# Patient Record
Sex: FEMALE | Race: WHITE | Employment: OTHER | URBAN - NONMETROPOLITAN AREA
[De-identification: names, ages, dates, MRNs, and addresses within clinical notes are randomized per-mention and may not be internally consistent; named-entity substitution may affect disease eponyms.]

---

## 2020-06-03 LAB
ALBUMIN: 3.7 GM/DL (ref 3.1–4.5)
ALP BLD-CCNC: 88 U/L (ref 45–117)
ALT SERPL-CCNC: 24 U/L (ref 12–78)
AST SERPL-CCNC: 22 IU/L (ref 3–35)
BILIRUB SERPL-MCNC: 0.3 MG/DL (ref 0.2–1)
BUN BLDV-MCNC: 25 MG/DL (ref 7–24)
CALCIUM SERPL-MCNC: 9 MG/DL (ref 8.5–10.5)
CHLORIDE BLD-SCNC: 110 MMOL/L (ref 98–107)
CHOLESTEROL: 99 MG/DL
CO2: 22 MMOL/L (ref 21–32)
CREAT SERPL-MCNC: 1.2 MG/DL (ref 0.55–1.02)
GFR AFRICAN AMERICAN: 51 ML/MIN
GFR SERPL CREATININE-BSD FRML MDRD: 42 ML/MIN/
GLUCOSE: 94 MG/DL (ref 65–99)
HCT VFR BLD CALC: 36 % (ref 37–47)
HDLC SERPL-MCNC: 38 MG/DL (ref 40–60)
HEMOGLOBIN: 11.8 G/DL (ref 12–16)
LDL CHOLESTEROL: 31 MG/DL (ref 9–159)
MCH RBC QN AUTO: 30.1 PG (ref 27–31)
MCHC RBC AUTO-ENTMCNC: 32.8 G/DL (ref 33–37)
MCV RBC AUTO: 91.8 FL (ref 81–99)
NUCLEATED RED BLOOD CELLS: 0 % (ref 0–0)
PDW BLD-RTO: 14.1 % (ref 0–14.5)
PLATELET # BLD: 239 10*3/UL (ref 130–400)
PMV BLD AUTO: 9.4 FL (ref 9.6–12.3)
POTASSIUM SERPL-SCNC: 4.3 MMOL/L (ref 3.5–5.1)
RBC # BLD: 3.92 10*6/UL (ref 4.1–5.1)
SODIUM BLD-SCNC: 139 MMOL/L (ref 136–145)
TOTAL PROTEIN: 8.8 GM/DL (ref 6.4–8.2)
TRIGL SERPL-MCNC: 149 MG/DL
TSH SERPL DL<=0.05 MIU/L-ACNC: 4.87 UIU/ML (ref 0.36–4.75)
VITAMIN D 25-HYDROXY: 54.3 NG/ML (ref 30–100)
VLDLC SERPL CALC-MCNC: 30 MG/DL (ref 6–40)
WBC # BLD: 5.7 10*3/UL (ref 4.8–10.8)

## 2020-07-15 LAB
ALBUMIN: 3.7 GM/DL (ref 3.1–4.5)
ALP BLD-CCNC: 86 U/L (ref 45–117)
ALT SERPL-CCNC: 18 U/L (ref 12–78)
AST SERPL-CCNC: 20 IU/L (ref 3–35)
BILIRUB SERPL-MCNC: 0.3 MG/DL (ref 0.2–1)
BUN BLDV-MCNC: 19 MG/DL (ref 7–24)
CALCIUM SERPL-MCNC: 9.3 MG/DL (ref 8.5–10.5)
CHLORIDE BLD-SCNC: 108 MMOL/L (ref 98–107)
CHOLESTEROL: 114 MG/DL
CO2: 24 MMOL/L (ref 21–32)
CREAT SERPL-MCNC: 1.04 MG/DL (ref 0.55–1.02)
GFR AFRICAN AMERICAN: > 60 ML/MIN
GFR SERPL CREATININE-BSD FRML MDRD: 50 ML/MIN/
GLUCOSE: 94 MG/DL (ref 65–99)
HCT VFR BLD CALC: 37.5 % (ref 37–47)
HDLC SERPL-MCNC: 36 MG/DL (ref 40–60)
HEMOGLOBIN: 11.9 G/DL (ref 12–16)
LDL CHOLESTEROL: 30 MG/DL (ref 9–159)
MCH RBC QN AUTO: 29.2 PG (ref 27–31)
MCHC RBC AUTO-ENTMCNC: 31.7 G/DL (ref 33–37)
MCV RBC AUTO: 91.9 FL (ref 81–99)
NUCLEATED RED BLOOD CELLS: 0 % (ref 0–0)
PDW BLD-RTO: 14.3 % (ref 0–14.5)
PLATELET # BLD: 264 10*3/UL (ref 130–400)
PMV BLD AUTO: 9.4 FL (ref 9.6–12.3)
POTASSIUM SERPL-SCNC: 3.9 MMOL/L (ref 3.5–5.1)
RBC # BLD: 4.08 10*6/UL (ref 4.1–5.1)
SODIUM BLD-SCNC: 140 MMOL/L (ref 136–145)
T4 FREE: 1.03 NG/DL (ref 0.76–1.46)
TOTAL PROTEIN: 8.6 GM/DL (ref 6.4–8.2)
TRIGL SERPL-MCNC: 242 MG/DL
TSH SERPL DL<=0.05 MIU/L-ACNC: 4.23 UIU/ML (ref 0.36–4.75)
VITAMIN D 25-HYDROXY: 49.3 NG/ML (ref 30–100)
VLDLC SERPL CALC-MCNC: 48 MG/DL (ref 6–40)
WBC # BLD: 6 10*3/UL (ref 4.8–10.8)

## 2020-08-05 ENCOUNTER — OUTSIDE SERVICES (OUTPATIENT)
Dept: FAMILY MEDICINE CLINIC | Age: 85
End: 2020-08-05
Payer: MEDICARE

## 2020-08-05 VITALS
WEIGHT: 114 LBS | TEMPERATURE: 96.8 F | DIASTOLIC BLOOD PRESSURE: 70 MMHG | HEART RATE: 74 BPM | RESPIRATION RATE: 16 BRPM | SYSTOLIC BLOOD PRESSURE: 122 MMHG

## 2020-08-05 PROBLEM — F03.90 DEMENTIA WITHOUT BEHAVIORAL DISTURBANCE (HCC): Status: ACTIVE | Noted: 2020-08-05

## 2020-08-05 PROBLEM — H40.9 GLAUCOMA: Status: ACTIVE | Noted: 2020-08-05

## 2020-08-05 PROBLEM — H35.30 MACULAR DEGENERATION: Status: ACTIVE | Noted: 2020-08-05

## 2020-08-05 PROBLEM — R27.0 ATAXIA: Status: ACTIVE | Noted: 2020-08-05

## 2020-08-05 PROBLEM — L20.9 ATOPIC DERMATITIS: Status: ACTIVE | Noted: 2020-08-05

## 2020-08-05 PROBLEM — L30.9 ECZEMA OF BOTH UPPER EXTREMITIES: Status: ACTIVE | Noted: 2020-08-05

## 2020-08-05 ASSESSMENT — ENCOUNTER SYMPTOMS
RESPIRATORY NEGATIVE: 1
ALLERGIC/IMMUNOLOGIC NEGATIVE: 1
GASTROINTESTINAL NEGATIVE: 1

## 2020-08-06 NOTE — PROGRESS NOTES
Subjective:      Patient ID: Iran Kanner is a 80 y.o. female. Patient seen today as an acute basis per request of nursing staff. Nursing staff stated that patient has been complaining of intense itch to the upper extremities. Patient has been scratching the upper extremities and both arms have scabbed areas. Patient has a red raised rash to the upper extremities and skin is very dry,      Review of Systems   Constitutional: Negative. HENT: Negative. Eyes: Positive for visual disturbance. Respiratory: Negative. Cardiovascular: Negative. Gastrointestinal: Negative. Endocrine: Negative. Genitourinary: Negative. Musculoskeletal: Negative. Skin: Positive for rash. Red raised rash to bilateral upper extremities. Dry skin to upper extremities. Allergic/Immunologic: Negative. Neurological: Negative. Hematological: Negative. Psychiatric/Behavioral: Positive for confusion. Objective:   Physical Exam  Vitals signs and nursing note reviewed. Constitutional:       General: She is not in acute distress. Appearance: Normal appearance. She is normal weight. She is not ill-appearing, toxic-appearing or diaphoretic. HENT:      Head: Normocephalic. Right Ear: Tympanic membrane, ear canal and external ear normal. There is no impacted cerumen. Left Ear: Tympanic membrane, ear canal and external ear normal. There is no impacted cerumen. Nose: Nose normal. No congestion or rhinorrhea. Mouth/Throat:      Mouth: Mucous membranes are moist.      Pharynx: Oropharynx is clear. No oropharyngeal exudate or posterior oropharyngeal erythema. Eyes:      General: No scleral icterus. Right eye: No discharge. Left eye: No discharge. Extraocular Movements: Extraocular movements intact. Conjunctiva/sclera: Conjunctivae normal.      Pupils: Pupils are equal, round, and reactive to light.    Neck:      Musculoskeletal: Normal range of motion and neck supple. No neck rigidity or muscular tenderness. Vascular: No carotid bruit. Cardiovascular:      Rate and Rhythm: Normal rate and regular rhythm. Pulses: Normal pulses. Heart sounds: Normal heart sounds. No murmur. No friction rub. No gallop. Pulmonary:      Effort: Pulmonary effort is normal. No respiratory distress. Breath sounds: Normal breath sounds. No stridor. No wheezing, rhonchi or rales. Chest:      Chest wall: No tenderness. Abdominal:      General: Abdomen is flat. Bowel sounds are normal. There is no distension. Palpations: Abdomen is soft. There is no mass. Tenderness: There is no abdominal tenderness. There is no right CVA tenderness, left CVA tenderness, guarding or rebound. Hernia: No hernia is present. Musculoskeletal: Normal range of motion. General: No swelling, tenderness, deformity or signs of injury. Right lower leg: No edema. Left lower leg: No edema. Lymphadenopathy:      Cervical: No cervical adenopathy. Skin:     General: Skin is warm and dry. Capillary Refill: Capillary refill takes less than 2 seconds. Coloration: Skin is not jaundiced or pale. Findings: Rash present. No bruising or lesion. Comments: Red raised rash to upper extremities. Skin is dry with small scabbed abrasions to upper extremities. Neurological:      General: No focal deficit present. Mental Status: She is alert. She is disoriented. Cranial Nerves: No cranial nerve deficit. Sensory: No sensory deficit. Motor: No weakness. Coordination: Coordination normal.      Gait: Gait normal.      Deep Tendon Reflexes: Reflexes normal.   Psychiatric:         Mood and Affect: Mood normal.         Behavior: Behavior normal.     /70   Pulse 74   Temp 96.8 °F (36 °C)   Resp 16   Wt 114 lb (51.7 kg)       Assessment:       Diagnosis Orders   1. Eczema of both upper extremities     2.  Atopic dermatitis, unspecified type     3. Dementia without behavioral disturbance, unspecified dementia type ([address]     4. Ataxia     5. Glaucoma, unspecified glaucoma type, unspecified laterality     6. Macular degeneration, unspecified laterality, unspecified type             Plan:    Reviewed medication and plan of care. Continue medication and plan of care. Begin Kenalog cream 0.5 % applied topically to upper extremities twice a day for 14 days, then reevaluate need. Noted that patient was treated with kenalog cream recently and skin did seem to be improved afterwards. Encourage kip bower. Claritin 10 mg PO at HS for itch for 10 days. Follow up in 2 weeks. Staff to notify Dr Juliann Mendes or NP of any changes or concerns.         Ronal Yanes, APRN - CNP

## 2020-08-20 RX ORDER — VIT C/E/CUPERIC/ZINC/LUTEIN 226-90-0.8
1 CAPSULE ORAL EVERY 12 HOURS
COMMUNITY
End: 2020-08-20 | Stop reason: SDUPTHER

## 2020-08-20 RX ORDER — VIT C/E/CUPERIC/ZINC/LUTEIN 226-90-0.8
1 CAPSULE ORAL EVERY 12 HOURS
Qty: 62 CAPSULE | Refills: 5 | Status: SHIPPED
Start: 2020-08-20 | End: 2021-02-24 | Stop reason: SDUPTHER

## 2020-09-02 LAB — TSH SERPL DL<=0.05 MIU/L-ACNC: 2.64 UIU/ML (ref 0.36–4.75)

## 2020-09-08 RX ORDER — TIMOLOL MALEATE 5 MG/ML
1 SOLUTION/ DROPS OPHTHALMIC DAILY
COMMUNITY
Start: 2020-08-20 | End: 2020-09-08 | Stop reason: SDUPTHER

## 2020-09-09 RX ORDER — TIMOLOL MALEATE 5 MG/ML
1 SOLUTION/ DROPS OPHTHALMIC DAILY
Qty: 5 BOTTLE | Refills: 0 | Status: SHIPPED
Start: 2020-09-09 | End: 2020-10-05 | Stop reason: SDUPTHER

## 2020-10-05 RX ORDER — TIMOLOL MALEATE 5 MG/ML
1 SOLUTION/ DROPS OPHTHALMIC DAILY
Qty: 5 ML | Refills: 5 | Status: SHIPPED
Start: 2020-10-05 | End: 2021-06-09 | Stop reason: SDUPTHER

## 2020-12-03 ENCOUNTER — OUTSIDE SERVICES (OUTPATIENT)
Dept: FAMILY MEDICINE CLINIC | Age: 85
End: 2020-12-03
Payer: MEDICARE

## 2020-12-03 VITALS
DIASTOLIC BLOOD PRESSURE: 69 MMHG | WEIGHT: 114 LBS | RESPIRATION RATE: 20 BRPM | TEMPERATURE: 98.1 F | SYSTOLIC BLOOD PRESSURE: 127 MMHG | HEART RATE: 73 BPM

## 2020-12-03 PROBLEM — R26.81 UNSTEADY GAIT: Status: ACTIVE | Noted: 2020-12-03

## 2020-12-03 PROBLEM — R29.898 WEAKNESS OF BOTH LOWER EXTREMITIES: Status: ACTIVE | Noted: 2020-12-03

## 2020-12-03 ASSESSMENT — ENCOUNTER SYMPTOMS
RESPIRATORY NEGATIVE: 1
ALLERGIC/IMMUNOLOGIC NEGATIVE: 1
GASTROINTESTINAL NEGATIVE: 1
EYES NEGATIVE: 1

## 2020-12-03 NOTE — PROGRESS NOTES
Subjective:      Patient ID: Vern Mendez is a 80 y.o. female. Patient seen today per request of nursing staff for a chief complaint of gait dysfunction and weakness. Staffing are asking for a recommendation of physical therapy at this time for strengthening. Patient complains of dry skin and itching especially to the upper extremities. Review of Systems   Constitutional: Negative. HENT: Negative. Eyes: Negative. Respiratory: Negative. Cardiovascular: Negative. Gastrointestinal: Negative. Endocrine: Negative. Genitourinary: Negative. Musculoskeletal: Positive for gait problem. Skin: Negative. Dry skin to upper extremities and itching   Allergic/Immunologic: Negative. Neurological: Positive for weakness. Hematological: Negative. Psychiatric/Behavioral: Positive for confusion. Objective:   Physical Exam  Vitals signs and nursing note reviewed. Constitutional:       General: She is not in acute distress. Appearance: Normal appearance. She is normal weight. She is not ill-appearing, toxic-appearing or diaphoretic. HENT:      Head: Normocephalic. Right Ear: Tympanic membrane, ear canal and external ear normal. There is no impacted cerumen. Left Ear: Tympanic membrane, ear canal and external ear normal. There is no impacted cerumen. Nose: Nose normal. No congestion or rhinorrhea. Mouth/Throat:      Mouth: Mucous membranes are moist.      Pharynx: Oropharynx is clear. No oropharyngeal exudate or posterior oropharyngeal erythema. Eyes:      General: No scleral icterus. Right eye: No discharge. Left eye: No discharge. Extraocular Movements: Extraocular movements intact. Conjunctiva/sclera: Conjunctivae normal.      Pupils: Pupils are equal, round, and reactive to light. Neck:      Musculoskeletal: Normal range of motion and neck supple. No neck rigidity or muscular tenderness. Vascular: No carotid bruit. Cardiovascular:      Rate and Rhythm: Normal rate and regular rhythm. Pulses: Normal pulses. Heart sounds: Normal heart sounds. No murmur. No friction rub. No gallop. Pulmonary:      Effort: Pulmonary effort is normal. No respiratory distress. Breath sounds: Normal breath sounds. No stridor. No wheezing, rhonchi or rales. Chest:      Chest wall: No tenderness. Abdominal:      General: Abdomen is flat. Bowel sounds are normal. There is no distension. Palpations: Abdomen is soft. There is no mass. Tenderness: There is no abdominal tenderness. There is no right CVA tenderness, left CVA tenderness, guarding or rebound. Hernia: No hernia is present. Musculoskeletal: Normal range of motion. General: No swelling, tenderness, deformity or signs of injury. Right lower leg: No edema. Left lower leg: No edema. Lymphadenopathy:      Cervical: No cervical adenopathy. Skin:     General: Skin is warm and dry. Capillary Refill: Capillary refill takes less than 2 seconds. Coloration: Skin is not jaundiced or pale. Findings: No bruising, erythema, lesion or rash. Comments: Extremely dry scaly skin to upper extremities. Neurological:      Mental Status: She is alert. She is disoriented. Cranial Nerves: No cranial nerve deficit. Sensory: No sensory deficit. Motor: Weakness present. Coordination: Coordination abnormal.      Gait: Gait abnormal.      Deep Tendon Reflexes: Reflexes normal.   Psychiatric:         Mood and Affect: Mood normal.         Behavior: Behavior normal.     /69   Pulse 73   Temp 98.1 °F (36.7 °C)   Resp 20   Wt 114 lb (51.7 kg)       Assessment:       Diagnosis Orders   1. Macular degeneration of both eyes, unspecified type     2. Unsteady gait     3. Weakness of both lower extremities     4.  Dementia without behavioral disturbance, unspecified dementia type (HCC)     5. Eczema of both upper extremities 6. Ataxia             Plan:      Reviewed medication and plan of care. Continue medications and plan of care. Staff to encourage use of house lotion to the upper extremities for dry skin/itch. Kelin Brim for PT evaluation and treatment for gait dysfunction and weakness. Follow up in one month and PRN. Staff to notify Dr or NP with any issues or concerns.         Ish Higuera, JOJO - CNP

## 2020-12-23 RX ORDER — OMEPRAZOLE 20 MG/1
20 CAPSULE, DELAYED RELEASE ORAL DAILY
Qty: 31 CAPSULE | Refills: 5 | Status: SHIPPED
Start: 2020-12-23 | End: 2021-06-24 | Stop reason: SDUPTHER

## 2020-12-23 RX ORDER — SENNOSIDES 8.6 MG
650 CAPSULE ORAL 2 TIMES DAILY
Qty: 62 TABLET | Refills: 5 | Status: SHIPPED | OUTPATIENT
Start: 2020-12-23 | End: 2021-12-27 | Stop reason: SDUPTHER

## 2021-01-13 LAB
ALBUMIN: 3.7 GM/DL (ref 3.1–4.5)
ALP BLD-CCNC: 83 U/L (ref 45–117)
ALT SERPL-CCNC: 22 U/L (ref 12–78)
AST SERPL-CCNC: 21 IU/L (ref 3–35)
BILIRUB SERPL-MCNC: 0.4 MG/DL (ref 0.2–1)
BUN BLDV-MCNC: 23 MG/DL (ref 7–24)
CALCIUM SERPL-MCNC: 9.2 MG/DL (ref 8.5–10.5)
CHLORIDE BLD-SCNC: 109 MMOL/L (ref 98–107)
CHOLESTEROL: 110 MG/DL
CO2: 19 MMOL/L (ref 21–32)
CREAT SERPL-MCNC: 1.15 MG/DL (ref 0.55–1.02)
GFR AFRICAN AMERICAN: 53 ML/MIN
GFR SERPL CREATININE-BSD FRML MDRD: 44 ML/MIN/
GLUCOSE: 112 MG/DL (ref 65–99)
HCT VFR BLD CALC: 38.6 % (ref 37–47)
HDLC SERPL-MCNC: 42 MG/DL (ref 40–60)
HEMOGLOBIN: 12.4 G/DL (ref 12–16)
LDL CHOLESTEROL: 34 MG/DL (ref 9–159)
MCH RBC QN AUTO: 29.2 PG (ref 27–31)
MCHC RBC AUTO-ENTMCNC: 32.1 G/DL (ref 33–37)
MCV RBC AUTO: 90.8 FL (ref 81–99)
NUCLEATED RED BLOOD CELLS: 0 % (ref 0–0)
PDW BLD-RTO: 14.2 % (ref 0–14.5)
PLATELET # BLD: 236 10*3/UL (ref 130–400)
PMV BLD AUTO: 9.4 FL (ref 9.6–12.3)
POTASSIUM SERPL-SCNC: 4.2 MMOL/L (ref 3.5–5.1)
RBC # BLD: 4.25 10*6/UL (ref 4.1–5.1)
SODIUM BLD-SCNC: 138 MMOL/L (ref 136–145)
T4 FREE: 1.04 NG/DL (ref 0.76–1.46)
TOTAL PROTEIN: 8.4 GM/DL (ref 6.4–8.2)
TRIGL SERPL-MCNC: 171 MG/DL
TSH SERPL DL<=0.05 MIU/L-ACNC: 3.34 UIU/ML (ref 0.36–4.75)
VITAMIN D 25-HYDROXY: 50.3 NG/ML (ref 30–100)
VLDLC SERPL CALC-MCNC: 34 MG/DL (ref 6–40)
WBC # BLD: 6 10*3/UL (ref 4.8–10.8)

## 2021-01-21 RX ORDER — ERGOCALCIFEROL 1.25 MG/1
50000 CAPSULE ORAL
Qty: 6 CAPSULE | Refills: 0 | Status: SHIPPED | OUTPATIENT
Start: 2021-01-21 | End: 2021-07-21 | Stop reason: SDUPTHER

## 2021-02-24 RX ORDER — VIT C/E/CUPERIC/ZINC/LUTEIN 226-90-0.8
1 CAPSULE ORAL EVERY 12 HOURS
Qty: 62 CAPSULE | Refills: 5 | Status: SHIPPED
Start: 2021-02-24 | End: 2021-08-18 | Stop reason: SDUPTHER

## 2021-03-03 ENCOUNTER — OUTSIDE SERVICES (OUTPATIENT)
Dept: FAMILY MEDICINE CLINIC | Age: 86
End: 2021-03-03
Payer: MEDICARE

## 2021-03-03 DIAGNOSIS — Z78.9 DECREASED INDEPENDENCE WITH ACTIVITIES OF DAILY LIVING: Primary | ICD-10-CM

## 2021-03-03 DIAGNOSIS — R63.0 DECREASED APPETITE: ICD-10-CM

## 2021-03-03 DIAGNOSIS — E55.9 VITAMIN D DEFICIENCY: ICD-10-CM

## 2021-03-03 DIAGNOSIS — F03.90 DEMENTIA WITHOUT BEHAVIORAL DISTURBANCE, UNSPECIFIED DEMENTIA TYPE: ICD-10-CM

## 2021-03-03 DIAGNOSIS — K21.9 GASTROESOPHAGEAL REFLUX DISEASE WITHOUT ESOPHAGITIS: ICD-10-CM

## 2021-03-03 NOTE — PROGRESS NOTES
3/3/2021    Jose Chandra  1/23/1930    This resident is being seen today for a follow-up evaluation visit. She is a resident who has long-term medical conditions including dementia, macular degeneration, cellulitis, glaucoma, ataxia, arthritis, vitamin D deficiency, osteoarthritis, arthroplasty, insomnia, confusion, lumbar spinal problems with lumbosacral strain, with surgical history of right total hip. She is a 80 y.o. female resident who is being seen today for a follow-up evaluation visit. There was specific paperwork that needed to be signed with respect to insurance with evaluation of resident needed. This is a resident who resides on our memory care unit/[facility]. She is ambulatory with the use of a walker and staff indicates that she has had no changes in behavior in terms of aggressive, combative, or disruptive behaviors. Staff does indicate that she does need some degree of assistance with respect to her activities of daily living. At this time she offers no acute complaints and denies any current pain, headaches or dizziness, sore throat, coughing or shortness of breath, nausea or vomiting, constipation or diarrhea, fever or chills, recent falls or syncopal events. Medications:  Boost energy drink at lunchtime  Acetaminophen 650 mg twice daily  Memantine HCL 5 mg twice daily  Mirtazapine 15 mg at bedtime  Omeprazole 20 mg daily  PreserVision lutein softgel every 12 hours  Rivastigmine 6 mg twice a day  Timolol 0.5% eyedrops 1 drop each eye daily  Vitamin D2 50,000 units monthly  Acetaminophen 650 mg daily as needed Izzy-Lanta 30 cc every 2-4 hours as needed  Loperamide 2 mg every 4 hours as needed  Zofran 4 mg every 6 hours as needed        Objective     Vital Signs: P1 28/64 pulse 84 temperature 96.1 weight 116 pounds    Physical examination:Skin is essentially warm and dry. HEENT unremarkable. Neck is supple. Heart regular rate and rhythm. No rubs, gallops or murmurs noted.   Lungs are clear to auscultation. No evidence of rhonchi, rales, or wheezing. Abdomen is soft, supple and non-tender. Bowel sounds are noted x4 quadrants. No rigidity, guarding or rebound tenderness. Negative Mo's, negative McBurney's, negative Abdulaziz's. Extremities; no true pitting edema. Pulses are adequate. No clubbing  or no cyanosis noted. Neurologically she  is alert and oriented x3. No evidence of paralysis or paresthesias noted. Diagnoses and all orders for this visit:    Decreased independence with activities of daily living  Comments: To maintain assistance. Dementia without behavioral disturbance, unspecified dementia type ([facility] [address]  Comments:  Stable with memantine and rivastigmine. Vitamin D deficiency  Comments:  Controlled with vitamin D supplementation. Monitor vitamin D level accordingly. Gastroesophageal reflux disease without esophagitis  Comments:  Stable with omeprazole. Decreased appetite  Comments:  Maintain boost energy drink daily. Monitor weights accordingly. Plan: Plan of care was discussed with the healthcare team with meds and labs reviewed. Resident did have recent labs 1/13/2021 with a BUN/creatinine 23/1.15 GFR 53 lipid panel within normal limits H/H 12.4/38.6 vitamin D 50.3 TSH 3.340. This resident did appear to be doing relatively well without any major cause for concern so we will just reevaluate her labs in the course the next 1 to 2 months. I did furthermore sign off on her paperwork regarding insurance coverage. I am to go ahead and continue forth with her current medical regimen as already set forth at this time and maintain close observation with respect to her dementia. I will furthermore track her intakes, monitor her weights and behaviors, and see her routinely and as needed with further orders forthcoming. JOSE MARIA DEUTSCH, APRN - CNP      *Note was creating using voice recognition software.   The document was reviewed however grammatical errors may exist.

## 2021-05-05 ENCOUNTER — OUTSIDE SERVICES (OUTPATIENT)
Dept: FAMILY MEDICINE CLINIC | Age: 86
End: 2021-05-05
Payer: MEDICARE

## 2021-05-05 DIAGNOSIS — H40.9 GLAUCOMA, UNSPECIFIED GLAUCOMA TYPE, UNSPECIFIED LATERALITY: ICD-10-CM

## 2021-05-05 DIAGNOSIS — F03.90 DEMENTIA WITHOUT BEHAVIORAL DISTURBANCE, UNSPECIFIED DEMENTIA TYPE: Primary | ICD-10-CM

## 2021-05-05 DIAGNOSIS — E55.9 VITAMIN D DEFICIENCY: ICD-10-CM

## 2021-05-05 DIAGNOSIS — G47.00 INSOMNIA, UNSPECIFIED TYPE: ICD-10-CM

## 2021-05-05 DIAGNOSIS — M19.90 OSTEOARTHRITIS, UNSPECIFIED OSTEOARTHRITIS TYPE, UNSPECIFIED SITE: ICD-10-CM

## 2021-05-05 NOTE — PROGRESS NOTES
2021    Yen Gibbons  1930    CC: This resident is being seen today for a follow-up evaluation visit with respect to a history and physical.    CODE STATUS: DNR CC    Allergies: No known drug allergies    Social history:  after being  for 36 years and does have 1 son and 1 daughter. Resident states she worked at an [employer] office as well as the  of her [employer]ism. She denies any smoking, alcohol use or illicit drug use. Family history: Resident states that she had 12 siblings and that her mother  at the age of 46 due to diabetic complications in her father at the age of 54 due to underlying lung cancer and heart complications. PMH:  She is a resident who has long-term medical conditions including dementia, macular degeneration, cellulitis, glaucoma, ataxia, arthritis, vitamin D deficiency, osteoarthritis, arthroplasty, insomnia, confusion, lumbar spinal problems with lumbosacral strain, with surgical history of right total hip. ROS:  She is a 80 y.o. female resident who is being seen today for a a history and physical.  There was specific paperwork that needed to be signed with respect to insurance with evaluation of resident needed. This is a resident who is responsive to verbal and tactile stimuli and fairly alert and oriented, who does reside here on the memory care unit/[facility]. Staffing indicates that she has had no changes in behavior in terms of aggressive, combative, or disruptive behaviors. This is a resident who utilizes a walker for assistance and does require some help with respect to activities of daily living. She is currently in no acute distress and offers no complaints of pain,  headaches or dizziness, sore throat, coughing or shortness of breath, nausea or vomiting, constipation or diarrhea, fever or chills, recent falls or syncopal events.       Medications:  Boost energy drink at lunchtime  Acetaminophen 650 mg twice daily  Memantine HCL 5 mg twice daily  Mirtazapine 15 mg at bedtime  Omeprazole 20 mg daily  PreserVision lutein softgel every 12 hours  Rivastigmine 6 mg twice a day  Timolol 0.5% eyedrops 1 drop each eye daily  Vitamin D2 50,000 units monthly  Acetaminophen 650 mg daily as needed Izzy-Lanta 30 cc every 2-4 hours as needed  Loperamide 2 mg every 4 hours as needed  Zofran 4 mg every 6 hours as needed        Objective     Vital Signs: /79 pulse 81 Resp 18 temperature 97.8 weight 117 pounds    Physical examination:Skin is essentially warm and dry. HEENT unremarkable. Neck is supple. Heart regular rate and rhythm. No rubs, gallops or murmurs noted. Lungs are clear to auscultation. No evidence of rhonchi, rales, or wheezing. Abdomen is soft, supple and non-tender. Bowel sounds are noted x4 quadrants. No rigidity, guarding or rebound tenderness. Negative Mo's, negative McBurney's, negative Abdulaziz's. Extremities; no true pitting edema. Pulses are adequate. No clubbing  or no cyanosis noted. Neurologically she  is alert and oriented x3. No evidence of paralysis or paresthesias noted. Diagnoses and all orders for this visit:    Dementia without behavioral disturbance, unspecified dementia type ([facility] [address]  Comments:  Resident resides on memory care and maintains memantine with rivastigmine    Vitamin D deficiency  Comments:  Controlled with vitamin D supplementation and close observation of vitamin D level    Osteoarthritis, unspecified osteoarthritis type, unspecified site  Comments:  Stable with vitamin supplementation    Insomnia, unspecified type  Comments:  Controlled and maintains mirtazapine at bedtime    Glaucoma, unspecified glaucoma type, unspecified laterality  Comments:  Stable with timolol drops    Plan: Plan of care was discussed with the healthcare team with meds and labs reviewed. This resident did have recent labs completed in January which were furthermore noted and reviewed.  Resident has been stable and without concerns, so we will therefore maintain her current medical regimen as directed with the benefit of this resident residing here in memory care. I will continue to track her intakes, monitor her weights and behaviors, and see her routinely and as needed with further orders forthcoming. JOSE MARIA DEUTSCH, APRN - CNP      *Note was creating using voice recognition software.   The document was reviewed however grammatical errors may exist.

## 2021-06-09 RX ORDER — TIMOLOL MALEATE 5 MG/ML
1 SOLUTION/ DROPS OPHTHALMIC DAILY
Qty: 5 ML | Refills: 5 | Status: SHIPPED | OUTPATIENT
Start: 2021-06-09 | End: 2021-07-09

## 2021-06-24 RX ORDER — OMEPRAZOLE 20 MG/1
20 CAPSULE, DELAYED RELEASE ORAL DAILY
Qty: 31 CAPSULE | Refills: 5 | Status: SHIPPED
Start: 2021-06-24 | End: 2021-12-27 | Stop reason: SDUPTHER

## 2021-06-24 RX ORDER — SENNOSIDES 8.6 MG
650 CAPSULE ORAL 2 TIMES DAILY
Qty: 62 TABLET | Refills: 5 | Status: SHIPPED | OUTPATIENT
Start: 2021-06-24 | End: 2021-07-25

## 2021-07-14 LAB
ALBUMIN: 3.5 GM/DL (ref 3.1–4.5)
ALP BLD-CCNC: 71 U/L (ref 45–117)
ALT SERPL-CCNC: 21 U/L (ref 12–78)
AST SERPL-CCNC: 21 IU/L (ref 3–35)
BILIRUB SERPL-MCNC: 0.3 MG/DL (ref 0.2–1)
BUN BLDV-MCNC: 21 MG/DL (ref 7–24)
CALCIUM SERPL-MCNC: 8.6 MG/DL (ref 8.5–10.5)
CHLORIDE BLD-SCNC: 112 MMOL/L (ref 98–107)
CHOLESTEROL: 99 MG/DL
CO2: 23 MMOL/L (ref 21–32)
CREAT SERPL-MCNC: 1.01 MG/DL (ref 0.55–1.02)
GFR AFRICAN AMERICAN: > 60 ML/MIN
GFR SERPL CREATININE-BSD FRML MDRD: 51 ML/MIN/
GLUCOSE: 91 MG/DL (ref 65–99)
HCT VFR BLD CALC: 36.3 % (ref 37–47)
HDLC SERPL-MCNC: 35 MG/DL (ref 40–60)
HEMOGLOBIN: 12 G/DL (ref 12–16)
LDL CHOLESTEROL: 43 MG/DL (ref 9–159)
MCH RBC QN AUTO: 30.8 PG (ref 27–31)
MCHC RBC AUTO-ENTMCNC: 33.1 G/DL (ref 33–37)
MCV RBC AUTO: 93.3 FL (ref 81–99)
NUCLEATED RED BLOOD CELLS: 0 % (ref 0–0)
PDW BLD-RTO: 13.3 % (ref 0–14.5)
PLATELET # BLD: 215 10*3/UL (ref 130–400)
PMV BLD AUTO: 9.2 FL (ref 9.6–12.3)
POTASSIUM SERPL-SCNC: 4.3 MMOL/L (ref 3.5–5.1)
RBC # BLD: 3.89 10*6/UL (ref 4.1–5.1)
SODIUM BLD-SCNC: 138 MMOL/L (ref 136–145)
T4 FREE: 1 NG/DL (ref 0.76–1.46)
TOTAL PROTEIN: 8.1 GM/DL (ref 6.4–8.2)
TRIGL SERPL-MCNC: 106 MG/DL
TSH SERPL DL<=0.05 MIU/L-ACNC: 2.3 UIU/ML (ref 0.36–4.75)
VITAMIN D 25-HYDROXY: 57.2 NG/ML (ref 30–100)
VLDLC SERPL CALC-MCNC: 21 MG/DL (ref 6–40)
WBC # BLD: 4 10*3/UL (ref 4.8–10.8)

## 2021-07-21 RX ORDER — ERGOCALCIFEROL 1.25 MG/1
50000 CAPSULE ORAL
Qty: 6 CAPSULE | Refills: 5 | Status: SHIPPED
Start: 2021-07-21 | End: 2022-07-21 | Stop reason: SDUPTHER

## 2021-08-18 RX ORDER — VIT C/E/CUPERIC/ZINC/LUTEIN 226-90-0.8
1 CAPSULE ORAL EVERY 12 HOURS
Qty: 62 CAPSULE | Refills: 5 | Status: SHIPPED
Start: 2021-08-18 | End: 2022-02-24 | Stop reason: SDUPTHER

## 2021-12-28 RX ORDER — SENNOSIDES 8.6 MG
650 CAPSULE ORAL 2 TIMES DAILY
Qty: 62 TABLET | Refills: 5 | Status: SHIPPED
Start: 2021-12-28 | End: 2022-06-23 | Stop reason: SDUPTHER

## 2021-12-28 RX ORDER — OMEPRAZOLE 20 MG/1
20 CAPSULE, DELAYED RELEASE ORAL DAILY
Qty: 31 CAPSULE | Refills: 5 | Status: SHIPPED
Start: 2021-12-28 | End: 2022-06-23 | Stop reason: SDUPTHER

## 2022-02-24 RX ORDER — VIT C/E/CUPERIC/ZINC/LUTEIN 226-90-0.8
1 CAPSULE ORAL EVERY 12 HOURS
Qty: 62 CAPSULE | Refills: 5 | Status: SHIPPED
Start: 2022-02-24 | End: 2022-08-31 | Stop reason: SDUPTHER

## 2022-03-10 RX ORDER — TIMOLOL MALEATE 5 MG/ML
SOLUTION/ DROPS OPHTHALMIC
Qty: 5 ML | Refills: 5 | Status: SHIPPED | OUTPATIENT
Start: 2022-03-10

## 2022-04-12 ENCOUNTER — OUTSIDE SERVICES (OUTPATIENT)
Dept: FAMILY MEDICINE CLINIC | Age: 87
End: 2022-04-12
Payer: MEDICARE

## 2022-04-12 DIAGNOSIS — F03.90 DEMENTIA WITHOUT BEHAVIORAL DISTURBANCE, UNSPECIFIED DEMENTIA TYPE: ICD-10-CM

## 2022-04-12 DIAGNOSIS — H35.30 MACULAR DEGENERATION OF BOTH EYES, UNSPECIFIED TYPE: ICD-10-CM

## 2022-04-12 DIAGNOSIS — E55.9 VITAMIN D DEFICIENCY: ICD-10-CM

## 2022-04-12 DIAGNOSIS — Z78.9 DECREASED INDEPENDENCE WITH ACTIVITIES OF DAILY LIVING: Primary | ICD-10-CM

## 2022-04-12 DIAGNOSIS — K21.9 GASTROESOPHAGEAL REFLUX DISEASE WITHOUT ESOPHAGITIS: ICD-10-CM

## 2022-04-12 NOTE — PROGRESS NOTES
4/12/2022    Alexa Bert  1/23/1930    CC This resident is being seen today for a follow-up evaluation visit. She is a resident who has long-term medical conditions including dementia, macular degeneration, cellulitis, glaucoma, ataxia, arthritis, vitamin D deficiency, osteoarthritis, arthroplasty, insomnia, confusion, lumbar spinal problems with lumbosacral strain, with surgical history of right total hip. She is a 80 y.o. female resident who is being seen today for a follow-up evaluation visit with resident residing here in the memory care unit at [facility]. She does remain responsive to verbal and tactile stimuli and is currently in no acute distress. She denies complaints of pain,headaches or dizziness, sore throat, coughing or shortness of breath, nausea or vomiting, constipation or diarrhea, fever or chills, recent falls or syncopal events. Medications:  Boost energy drink at lunchtime  Acetaminophen 650 mg twice daily  Memantine HCL 5 mg twice daily  Mirtazapine 15 mg at bedtime  Omeprazole 20 mg daily  PreserVision lutein softgel every 12 hours  Rivastigmine 6 mg twice a day  Timolol 0.5% eyedrops 1 drop each eye daily  Vitamin D2 50,000 units monthly  Acetaminophen 650 mg daily as needed Izzy-Lanta 30 cc every 2-4 hours as needed  Loperamide 2 mg every 4 hours as needed  Zofran 4 mg every 6 hours as needed        Objective     Vital Signs: /60 pulse 70 resp 20 temperature 97.6 weight 114 pounds    Physical examination:Skin is essentially warm and dry. HEENT unremarkable. Neck is supple. Heart regular rate and rhythm. No rubs, gallops or murmurs noted. Lungs are clear to auscultation. No evidence of rhonchi, rales, or wheezing. Abdomen is soft, supple and non-tender. Bowel sounds are noted x4 quadrants. No rigidity, guarding or rebound tenderness. Negative Mo's, negative McBurney's, negative Abdulaziz's. Extremities; no true pitting edema. Pulses are adequate.  No clubbing  or no cyanosis noted. Neurologically she  is alert and oriented x1-2. No evidence of paralysis or paresthesias noted. Diagnoses and all orders for this visit:    Decreased independence with activities of daily living  Comments:  Resides on true Ben with assistance given    Dementia without behavioral disturbance, unspecified dementia type ([facility] [address]  Comments:  Resides on true Ben and does maintain memantine with low-dose Remeron    Vitamin D deficiency  Comments:  Stable with vitamin D supplementation    Gastroesophageal reflux disease without esophagitis  Comments:  Controlled with omeprazole    Macular degeneration of both eyes, unspecified type  Comments:  Stable surveillance with eyedrops    Plan: Plan of care was discussed with the healthcare team with meds and labs reviewed. Did have recent labs completed in July, which are normally completed every 6 months. I am therefore going to recommend a CBC with a CMP and a vitamin D level to be drawn in the morning. She will maintain her current medical regimen, as she does appear to be tolerating it in a satisfactory manner. She will continue forth with her boost supplement. I will continue to track her intake, monitor her weights and behaviors, and see her routinely and as needed with further orders forthcoming. JOSE MARIA DEUTSCH, APRN - CNP      *Note was creating using voice recognition software.   The document was reviewed however grammatical errors may exist.

## 2022-04-14 LAB
ABSOLUTE BASO #: 0 10*3/UL (ref 0–0.1)
ABSOLUTE EOS #: 0.2 10*3/UL (ref 0–0.4)
ABSOLUTE NEUT #: 2.2 10*3/UL (ref 2.3–7.9)
ALBUMIN: 3.3 GM/DL (ref 3.1–4.5)
ALP BLD-CCNC: 84 U/L (ref 45–117)
ALT SERPL-CCNC: 14 U/L (ref 12–78)
AST SERPL-CCNC: 17 IU/L (ref 3–35)
BASOPHILS %: 0.6 % (ref 0–1)
BILIRUB SERPL-MCNC: 0.3 MG/DL (ref 0.2–1)
BUN BLDV-MCNC: 22 MG/DL (ref 7–24)
CALCIUM SERPL-MCNC: 9 MG/DL (ref 8.5–10.5)
CHLORIDE BLD-SCNC: 112 MMOL/L (ref 98–107)
CO2: 24 MMOL/L (ref 21–32)
CREAT SERPL-MCNC: 0.93 MG/DL (ref 0.55–1.02)
EOSINOPHILS %: 4.8 % (ref 1–4)
GFR AFRICAN AMERICAN: > 60 ML/MIN
GFR SERPL CREATININE-BSD FRML MDRD: 56 ML/MIN/
GLUCOSE: 98 MG/DL (ref 65–99)
HCT VFR BLD CALC: 36.8 % (ref 37–47)
HEMOGLOBIN: 12.1 G/DL (ref 12–16)
IMMATURE GRANULOCYTES #: 0 10*3/UL (ref 0–0.1)
IMMATURE GRANULOCYTES: 0.4 % (ref 0–1)
LYMPHOCYTE %: 38.3 % (ref 27–41)
LYMPHOCYTES # BLD: 1.8 10*3/UL (ref 1.3–4.4)
MCH RBC QN AUTO: 30.7 PG (ref 27–31)
MCHC RBC AUTO-ENTMCNC: 32.9 G/DL (ref 33–37)
MCV RBC AUTO: 93.4 FL (ref 81–99)
MONOCYTES # BLD: 0.5 10*3/UL (ref 0.1–1)
MONOCYTES %: 9.8 % (ref 3–9)
NEUTROPHILS %: 46.1 % (ref 47–73)
NUCLEATED RED BLOOD CELLS: 0 % (ref 0–0)
PDW BLD-RTO: 13.9 % (ref 0–14.5)
PLATELET # BLD: 209 10*3/UL (ref 130–400)
PMV BLD AUTO: 9.3 FL (ref 9.6–12.3)
POTASSIUM SERPL-SCNC: 4.1 MMOL/L (ref 3.5–5.1)
RBC # BLD: 3.94 10*6/UL (ref 4.1–5.1)
SODIUM BLD-SCNC: 141 MMOL/L (ref 136–145)
TOTAL PROTEIN: 7.7 GM/DL (ref 6.4–8.2)
VITAMIN D 25-HYDROXY: 64.8 NG/ML (ref 30–100)
WBC # BLD: 4.8 10*3/UL (ref 4.8–10.8)

## 2022-04-26 ENCOUNTER — OUTSIDE SERVICES (OUTPATIENT)
Dept: FAMILY MEDICINE CLINIC | Age: 87
End: 2022-04-26
Payer: MEDICARE

## 2022-04-26 DIAGNOSIS — E55.9 VITAMIN D DEFICIENCY: ICD-10-CM

## 2022-04-26 DIAGNOSIS — H35.30 MACULAR DEGENERATION, UNSPECIFIED LATERALITY, UNSPECIFIED TYPE: ICD-10-CM

## 2022-04-26 DIAGNOSIS — R26.2 AMBULATORY DYSFUNCTION: Primary | ICD-10-CM

## 2022-04-26 DIAGNOSIS — G47.00 INSOMNIA, UNSPECIFIED TYPE: ICD-10-CM

## 2022-04-26 DIAGNOSIS — M19.90 OSTEOARTHRITIS, UNSPECIFIED OSTEOARTHRITIS TYPE, UNSPECIFIED SITE: ICD-10-CM

## 2022-04-26 NOTE — PROGRESS NOTES
4/26/2022    Asuncion Driscoll  1/23/1930    This resident is being seen today for an acute evaluation visit. She is a resident who has long-term medical conditions including dementia, macular degeneration, cellulitis, glaucoma, ataxia, arthritis, vitamin D deficiency, osteoarthritis, arthroplasty, insomnia, confusion, lumbar spinal problems with lumbosacral strain, with surgical history of right total hip. She is a 80 y.o. female resident who is being seen today for an acute evaluation visit per the request of staffing secondary to concerns of ambulatory dysfunction with which they have asked for physical therapy services. Resident does reside in the memory care unit and is alert and responsive to verbal and tactile stimuli. She offers no acute complaints at the time my evaluation and I did discuss with her possible therapy services with which she responded \"I do not like to exercise. \"  She states that in high school she would place sick so that she did not have to do gym classes. She denies any complaints in terms of pain, headaches or dizziness, sore throat, chest pain, coughing or shortness of breath, nausea or vomiting, constipation or diarrhea, dysuria or frequency, fever or chills, falls or syncopal events. Medications:  Boost energy drink at lunchtime  Acetaminophen 650 mg twice daily  Memantine HCL 5 mg twice daily  Mirtazapine 15 mg at bedtime  Omeprazole 20 mg daily  PreserVision lutein softgel every 12 hours  Rivastigmine 6 mg twice a day  Timolol 0.5% eyedrops 1 drop each eye daily  Vitamin D2 50,000 units monthly  Acetaminophen 650 mg daily as needed Izzy-Lanta 30 cc every 2-4 hours as needed  Loperamide 2 mg every 4 hours as needed  Zofran 4 mg every 6 hours as needed        Objective     Vital Signs: /60 pulse 70 resp 20 temperature 97.6 weight 114 pounds    Physical examination:Skin is essentially warm and dry. HEENT unremarkable. Neck is supple. Heart regular rate and rhythm.  No rubs, gallops or murmurs noted. Lungs are clear to auscultation. No evidence of rhonchi, rales, or wheezing. Abdomen is soft, supple and non-tender. Bowel sounds are noted x4 quadrants. No rigidity, guarding or rebound tenderness. Negative Mo's, negative McBurney's, negative Abdulaziz's. Extremities; no true pitting edema. Pulses are adequate. No clubbing  or no cyanosis noted. Neurologically she  is alert and oriented x1-2. No evidence of paralysis or paresthesias noted. Diagnoses and all orders for this visit:    Ambulatory dysfunction  Comments:  Physical therapy to evaluate and treat    Macular degeneration, unspecified laterality, unspecified type  Comments:  Stable with eyedrops with close observation with respect to Department of ophthalmology    Insomnia, unspecified type  Comments:  Controlled with observation    Osteoarthritis, unspecified osteoarthritis type, unspecified site  Comments:  Stable with vitamin supplementation    Vitamin D deficiency  Comments:  Controlled with vitamin D       Plan: Plan of care was discussed with the healthcare team with meds and labs reviewed. BUN/creatinine 22/0.93 with a GFR of 50 6H/H12.1/36.8. Staff has concerns regarding question of ambulatory dysfunction, with which I am been to recommend physical therapy to evaluate and treat. I am unsure as to whether or not she will be participatory, but I will have her evaluated. She will maintain her current medical regimen with the plan of care as otherwise directed and I will see this resident routinely and as needed with further orders forthcoming. JOSE MARIA DEUTSCH, APRN - CNP      *Note was creating using voice recognition software.   The document was reviewed however grammatical errors may exist.

## 2022-05-17 ENCOUNTER — OUTSIDE SERVICES (OUTPATIENT)
Dept: FAMILY MEDICINE CLINIC | Age: 87
End: 2022-05-17
Payer: MEDICARE

## 2022-05-17 DIAGNOSIS — F03.90 DEMENTIA WITHOUT BEHAVIORAL DISTURBANCE, UNSPECIFIED DEMENTIA TYPE: ICD-10-CM

## 2022-05-17 DIAGNOSIS — R29.898 WEAKNESS OF BOTH LOWER EXTREMITIES: ICD-10-CM

## 2022-05-17 DIAGNOSIS — E55.9 VITAMIN D DEFICIENCY: ICD-10-CM

## 2022-05-17 DIAGNOSIS — H35.30 MACULAR DEGENERATION OF BOTH EYES, UNSPECIFIED TYPE: ICD-10-CM

## 2022-05-17 DIAGNOSIS — K21.9 GASTROESOPHAGEAL REFLUX DISEASE WITHOUT ESOPHAGITIS: Primary | ICD-10-CM

## 2022-05-17 NOTE — PROGRESS NOTES
Abdulaziz's. Extremities; no true pitting edema. Pulses are adequate. No clubbing  or no cyanosis noted. Neurologically she  is alert and oriented x1-2. No evidence of paralysis or paresthesias noted. Diagnoses and all orders for this visit:    Gastroesophageal reflux disease without esophagitis  Comments:  Currently controlled with omeprazole    Weakness of both lower extremities  Comments:  Bed to chair confined    Macular degeneration of both eyes, unspecified type  Comments:  Maintain utilization of wheelchair for safety purposes and continue with eyedrops as recommended    Dementia without behavioral disturbance, unspecified dementia type ([facility] [address]  Comments:  Stable with rivastigmine    Vitamin D deficiency  Comments:  Controlled with vitamin D supplementation       Plan: Plan of care was discussed with the healthcare team with meds and labs reviewed. She does appear to be doing relatively well at the current time and staff has not had any major concerns. I do feel that we need to keep an eye on the area on her left arm, although she does state that it is chronic in nature. She will furthermore continue with her current medical management as directed and I will see this resident routinely and as needed with further orders forthcoming. JOSE MARIA DEUTSCH, APRN - CNP      *Note was creating using voice recognition software.   The document was reviewed however grammatical errors may exist.

## 2022-06-21 ENCOUNTER — OUTSIDE SERVICES (OUTPATIENT)
Dept: FAMILY MEDICINE CLINIC | Age: 87
End: 2022-06-21
Payer: MEDICARE

## 2022-06-21 DIAGNOSIS — F03.90 DEMENTIA WITHOUT BEHAVIORAL DISTURBANCE, UNSPECIFIED DEMENTIA TYPE: ICD-10-CM

## 2022-06-21 DIAGNOSIS — H40.9 GLAUCOMA, UNSPECIFIED GLAUCOMA TYPE, UNSPECIFIED LATERALITY: ICD-10-CM

## 2022-06-21 DIAGNOSIS — M19.90 OSTEOARTHRITIS, UNSPECIFIED OSTEOARTHRITIS TYPE, UNSPECIFIED SITE: ICD-10-CM

## 2022-06-21 DIAGNOSIS — K21.9 GASTROESOPHAGEAL REFLUX DISEASE WITHOUT ESOPHAGITIS: ICD-10-CM

## 2022-06-21 DIAGNOSIS — G47.00 INSOMNIA, UNSPECIFIED TYPE: Primary | ICD-10-CM

## 2022-06-21 NOTE — PROGRESS NOTES
6/21/2022    [facility]  1/23/1930    This resident is being seen today for a follow-up evaluation visit. She is a resident who has long-term medical conditions including dementia, macular degeneration, cellulitis, glaucoma, ataxia, arthritis, vitamin D deficiency, osteoarthritis, arthroplasty, insomnia, confusion, lumbar spinal problems with lumbosacral strain, with surgical history of right total hip. She is a 80 y.o. female resident who is being seen today for a follow-up evaluation. She is extremely pleasant at this time and relatively repetitive and does reside in the memory care unit. She denies any complaints with respect to headaches or dizziness, sore throat, chest pain, coughing or shortness of breath, nausea or vomiting, constipation or diarrhea, dysuria or frequency, fever or chills, falls or syncopal events. Medications:  Boost energy drink at lunchtime  Acetaminophen 650 mg twice daily  Memantine HCL 5 mg twice daily  Mirtazapine 15 mg at bedtime  Omeprazole 20 mg daily  PreserVision lutein softgel every 12 hours  Rivastigmine 6 mg twice a day  Timolol 0.5% eyedrops 1 drop each eye daily  Vitamin D2 50,000 units monthly  Acetaminophen 650 mg daily as needed Izzy-Lanta 30 cc every 2-4 hours as needed  Loperamide 2 mg every 4 hours as needed  Zofran 4 mg every 6 hours as needed        Objective     Vital Signs: /73 pulse 79 resp 16 temperature 97.6 Sao2 98% weight 117 pounds    Physical examination:Skin is essentially warm and dry. HEENT unremarkable. Neck is supple. Heart regular rate and rhythm. No rubs, gallops or murmurs noted. Lungs are clear to auscultation. No evidence of rhonchi, rales, or wheezing. Abdomen is soft, supple and non-tender. Bowel sounds are noted x4 quadrants. No rigidity, guarding or rebound tenderness. Negative Mo's, negative McBurney's, negative Abdulaziz's. Extremities; no true pitting edema. Pulses are adequate. No clubbing  or no cyanosis noted.

## 2022-06-23 RX ORDER — SENNOSIDES 8.6 MG
650 CAPSULE ORAL 2 TIMES DAILY
Qty: 62 TABLET | Refills: 5 | Status: SHIPPED | OUTPATIENT
Start: 2022-06-23 | End: 2022-12-26

## 2022-06-23 RX ORDER — OMEPRAZOLE 20 MG/1
20 CAPSULE, DELAYED RELEASE ORAL DAILY
Qty: 31 CAPSULE | Refills: 5 | Status: SHIPPED | OUTPATIENT
Start: 2022-06-23 | End: 2022-12-26

## 2022-07-05 ENCOUNTER — OUTSIDE SERVICES (OUTPATIENT)
Dept: FAMILY MEDICINE CLINIC | Age: 87
End: 2022-07-05
Payer: MEDICARE

## 2022-07-05 DIAGNOSIS — Z78.9 DECREASED INDEPENDENCE WITH ACTIVITIES OF DAILY LIVING: ICD-10-CM

## 2022-07-05 DIAGNOSIS — H91.93 BILATERAL HEARING LOSS, UNSPECIFIED HEARING LOSS TYPE: Primary | ICD-10-CM

## 2022-07-05 DIAGNOSIS — G47.00 INSOMNIA, UNSPECIFIED TYPE: ICD-10-CM

## 2022-07-05 DIAGNOSIS — E55.9 VITAMIN D DEFICIENCY: ICD-10-CM

## 2022-07-05 DIAGNOSIS — F03.90 DEMENTIA WITHOUT BEHAVIORAL DISTURBANCE, UNSPECIFIED DEMENTIA TYPE: ICD-10-CM

## 2022-07-05 NOTE — PROGRESS NOTES
7/5/2022    Zora Clarke  1/23/1930    This resident is being seen today for an acute evaluation visit. She is a resident who has long-term medical conditions including dementia, macular degeneration, cellulitis, glaucoma, ataxia, arthritis, vitamin D deficiency, osteoarthritis, arthroplasty, insomnia, confusion, lumbar spinal problems with lumbosacral strain, with surgical history of right total hip. She is a 80 y.o. female resident who is being seen today for an acute evaluation per the request of staff secondary to what they described as \"hearing loss. \"  Resident did apparently complained of this to staffing. Staff indicates that this has been a long term problem for this resident. She is a resident that resides in the memory care unit and he denies complaints on today's evaluation with respect to pain, headaches or dizziness, sore throat, coughing or shortness of breath, nausea or vomiting, constipation or diarrhea, dysuria or frequency, fever or chills, falls or syncopal events. Medications:  Boost energy drink at lunchtime  Acetaminophen 650 mg twice daily  Memantine HCL 5 mg twice daily  Mirtazapine 15 mg at bedtime  Omeprazole 20 mg daily  PreserVision lutein softgel every 12 hours  Rivastigmine 6 mg twice a day  Timolol 0.5% eyedrops 1 drop each eye daily  Vitamin D2 50,000 units monthly  Acetaminophen 650 mg daily as needed Izzy-Lanta 30 cc every 2-4 hours as needed  Loperamide 2 mg every 4 hours as needed  Zofran 4 mg every 6 hours as needed        Objective     Vital Signs: /73 pulse 79 resp 16 temperature 97.6 Sao2 98% weight 117 pounds    Physical examination:Skin is essentially warm and dry. HEENT unremarkable. Neck is supple. Heart regular rate and rhythm. No rubs, gallops or murmurs noted. Lungs are clear to auscultation. No evidence of rhonchi, rales, or wheezing. Abdomen is soft, supple and non-tender. Bowel sounds are noted x4 quadrants.  No rigidity, guarding or rebound tenderness. Negative Mo's, negative McBurney's, negative Abdulaziz's. Extremities; no true pitting edema. Pulses are adequate. No clubbing  or no cyanosis noted. Neurologically she  is alert and oriented x1-2. No evidence of paralysis or paresthesias noted. Diagnoses and all orders for this visit:    Bilateral hearing loss, unspecified hearing loss type  Comments:  Refer to audiologist and initiate Debrox drops 2 drops to the bilateral ears daily for 5 days    Vitamin D deficiency  Comments:  Controlled with vitamin D supplementation    Dementia without behavioral disturbance, unspecified dementia type ([address]  Comments:  Stable with low-dose memantine, mirtazapine and rivastigmine    Decreased independence with activities of daily living  Comments:  Resides on assisted living with recommendations for help on an as-needed basis    Insomnia, unspecified type  Comments:  Currently controlled       Plan: Plan of care was discussed with the healthcare team with meds and labs reviewed. Regarding this resident's concerns of hearing loss, I will recommend that she see an audiologist.  In the meantime, I will recommend Debrox drops with 2 drops to the bilateral ears on a daily basis for the course of 5 days. I do, unfortunately, feel that most of this is secondary to age related issues. She will furthermore continue with her plan of care as stated and I will see her routinely and as needed with further orders forthcoming. JOSE MARIA DEUTSCH, JOJO - CNP      *Note was creating using voice recognition software.   The document was reviewed however grammatical errors may exist.

## 2022-07-13 LAB
ALBUMIN: 3.3 GM/DL (ref 3.1–4.5)
ALP BLD-CCNC: 81 U/L (ref 45–117)
ALT SERPL-CCNC: 13 U/L (ref 12–78)
AST SERPL-CCNC: 20 IU/L (ref 3–35)
BILIRUB SERPL-MCNC: 0.3 MG/DL (ref 0.2–1)
BUN BLDV-MCNC: 13 MG/DL (ref 7–24)
CALCIUM SERPL-MCNC: 8.9 MG/DL (ref 8.5–10.5)
CHLORIDE BLD-SCNC: 110 MMOL/L (ref 98–107)
CHOLESTEROL: 98 MG/DL
CO2: 25 MMOL/L (ref 21–32)
CREAT SERPL-MCNC: 1.01 MG/DL (ref 0.55–1.02)
GFR AFRICAN AMERICAN: > 60 ML/MIN
GFR SERPL CREATININE-BSD FRML MDRD: 51 ML/MIN/
GLUCOSE: 86 MG/DL (ref 65–99)
HCT VFR BLD CALC: 35.4 % (ref 37–47)
HDLC SERPL-MCNC: 41 MG/DL (ref 40–60)
HEMOGLOBIN: 11.8 G/DL (ref 12–16)
LDL CHOLESTEROL: 34 MG/DL (ref 9–159)
MCH RBC QN AUTO: 30.9 PG (ref 27–31)
MCHC RBC AUTO-ENTMCNC: 33.3 G/DL (ref 33–37)
MCV RBC AUTO: 92.7 FL (ref 81–99)
NUCLEATED RED BLOOD CELLS: 0 % (ref 0–0)
PDW BLD-RTO: 14.3 % (ref 0–14.5)
PLATELET # BLD: 238 10*3/UL (ref 130–400)
PMV BLD AUTO: 9.2 FL (ref 9.6–12.3)
POTASSIUM SERPL-SCNC: 4.6 MMOL/L (ref 3.5–5.1)
RBC # BLD: 3.82 10*6/UL (ref 4.1–5.1)
SODIUM BLD-SCNC: 140 MMOL/L (ref 136–145)
T4 FREE: 0.94 NG/DL (ref 0.76–1.46)
TOTAL PROTEIN: 7.5 GM/DL (ref 6.4–8.2)
TRIGL SERPL-MCNC: 115 MG/DL
TSH SERPL DL<=0.05 MIU/L-ACNC: 2.3 UIU/ML (ref 0.36–4.75)
VITAMIN D 25-HYDROXY: 48.9 NG/ML (ref 30–100)
VLDLC SERPL CALC-MCNC: 23 MG/DL (ref 6–40)
WBC # BLD: 5.2 10*3/UL (ref 4.8–10.8)

## 2022-07-21 RX ORDER — ERGOCALCIFEROL 1.25 MG/1
50000 CAPSULE ORAL
Qty: 6 CAPSULE | Refills: 5 | Status: SHIPPED | OUTPATIENT
Start: 2022-07-21 | End: 2022-07-27

## 2022-07-27 ENCOUNTER — OUTSIDE SERVICES (OUTPATIENT)
Dept: FAMILY MEDICINE CLINIC | Age: 87
End: 2022-07-27
Payer: MEDICARE

## 2022-07-27 DIAGNOSIS — Z00.8 ENCOUNTER FOR OTHER GENERAL EXAMINATION: Primary | ICD-10-CM

## 2022-07-27 PROCEDURE — G0439 PPPS, SUBSEQ VISIT: HCPCS | Performed by: FAMILY MEDICINE

## 2022-07-27 ASSESSMENT — LIFESTYLE VARIABLES
HOW MANY STANDARD DRINKS CONTAINING ALCOHOL DO YOU HAVE ON A TYPICAL DAY: PATIENT DOES NOT DRINK
HOW OFTEN DO YOU HAVE A DRINK CONTAINING ALCOHOL: NEVER

## 2022-07-27 NOTE — PATIENT INSTRUCTIONS
Personalized Preventive Plan for Oumou Womack - 7/27/2022  Medicare offers a range of preventive health benefits. Some of the tests and screenings are paid in full while other may be subject to a deductible, co-insurance, and/or copay. Some of these benefits include a comprehensive review of your medical history including lifestyle, illnesses that may run in your family, and various assessments and screenings as appropriate. After reviewing your medical record and screening and assessments performed today your provider may have ordered immunizations, labs, imaging, and/or referrals for you. A list of these orders (if applicable) as well as your Preventive Care list are included within your After Visit Summary for your review. Other Preventive Recommendations:    A preventive eye exam performed by an eye specialist is recommended every 1-2 years to screen for glaucoma; cataracts, macular degeneration, and other eye disorders. A preventive dental visit is recommended every 6 months. Try to get at least 150 minutes of exercise per week or 10,000 steps per day on a pedometer . Order or download the FREE \"Exercise & Physical Activity: Your Everyday Guide\" from [employer]. Call 2-130.563.7437 or search [employer] online. You need 4179-1387 mg of calcium and 6472-4232 IU of vitamin D per day. It is possible to meet your calcium requirement with diet alone, but a vitamin D supplement is usually necessary to meet this goal.  When exposed to the sun, use a sunscreen that protects against both UVA and UVB radiation with an SPF of 30 or greater. Reapply every 2 to 3 hours or after sweating, drying off with a towel, or swimming. Always wear a seat belt when traveling in a car. Always wear a helmet when riding a bicycle or motorcycle.

## 2022-07-27 NOTE — PROGRESS NOTES
Medicare Annual Wellness Visit    Ramirez Patel is here for Medicare AWV    Assessment & Plan   Encounter for other general examination    Recommendations for Preventive Services Due: see orders and patient instructions/AVS.  Recommended screening schedule for the next 5-10 years is provided to the patient in written form: see Patient Instructions/AVS.     Return for Medicare Annual Wellness Visit in 1 year. Subjective       Patient's complete Health Risk Assessment and screening values have been reviewed and are found in Flowsheets. The following problems were reviewed today and where indicated follow up appointments were made and/or referrals ordered. Positive Risk Factor Screenings with Interventions:     Cognitive: Words recalled: 1 Word Recalled  Clock Drawing Test (CDT): (!) Abnormal (unable to draw)  Total Score Interpretation: Abnormal Mini-Cog  Did the patient refuse to take the cognition test?: No  Cognitive Impairment Interventions:   Addressed at assisted living facility           [address] [place] [address] and ACP:  General  In general, how would you say your health is?: Good  In the past 7 days, have you experienced any of the following: New or Increased Pain, New or Increased Fatigue, Loneliness, Social Isolation, Stress or Anger?: No  Do you get the social and emotional support that you need?: Yes  Do you have a Living Will?: Yes    Advance Directives       Power of [address] Will ACP-Advance Directive ACP-Power of     Not on File Not on File Not on File Not on File          General Health Risk Interventions:  Lives at assisted living facility    Health Habits/Nutrition:  Physical Activity: Inactive    Days of Exercise per Week: 0 days    Minutes of Exercise per Session: 0 min     Have you lost any weight without trying in the past 3 months?: (!) Yes     Have you seen the dentist within the past year?: (!) No  Health Habits/Nutrition Interventions:  Dental exam overdue:  patient declines dental evaluation    Hearing/Vision:  Do you or your family notice any trouble with your hearing that hasn't been managed with hearing aids?: No  Do you have difficulty driving, watching TV, or doing any of your daily activities because of your eyesight?: No  Have you had an eye exam within the past year?: (!) No  No results found. Hearing/Vision Interventions:  Vision concerns:  patient declines any further evaluation/treatment for this issue     ADLs:  In the past 7 days, did you need help from others to perform any of the following everyday activities: Eating, dressing, grooming, bathing, toileting, or walking/balance?: No  In the past 7 days, did you need help from others to take care of any of the following: Laundry, housekeeping, banking/finances, shopping, telephone use, food preparation, transportation, or taking medications?: (!) Yes  Select all that apply: (!) Food Preparation, Housekeeping, Laundry  ADL Interventions:  Lives at assisted living, all needs taken care of on site          Objective   There were no vitals filed for this visit. There is no height or weight on file to calculate BMI. No Known Allergies  Prior to Visit Medications    Medication Sig Taking? Authorizing Provider   vitamin D (ERGOCALCIFEROL) 1.25 MG (91147 UT) CAPS capsule Take 1 capsule by mouth every 30 days for 1 day On the 2nd Yes Arash Casillas, DO   omeprazole (PRILOSEC) 20 MG delayed release capsule Take 1 capsule by mouth Daily Every morning.  Yes Arash Casillas, DO   acetaminophen (TYLENOL) 650 MG extended release tablet Take 1 tablet by mouth 2 times daily Yes Arash Casillas, DO   timolol (TIMOPTIC) 0.5 % ophthalmic solution INSTILL 1 DROP IN [facility] EYE EVERY DAY Yes Arash Casillas, DO   Multiple Vitamins-Minerals (PRESERVISION/LUTEIN) CAPS Take 1 capsule by mouth every 12 hours Yes Luz Noe, APRN - CNP   acetaminophen (GNP 8 HOUR ARTHRITIS RELIEF) 650 MG extended release tablet Take 1 tablet by mouth 2 times daily  JOJO Esparza - CNP       CareTeam (Including outside providers/suppliers regularly involved in providing care):   Patient Care Team:  Marci Abdi DO as PCP - General (Family Medicine)  Marci Abdi DO as PCP - [facility] [place] Provider     Reviewed and updated this visit:  Allergies  Meds

## 2022-08-16 ENCOUNTER — OUTSIDE SERVICES (OUTPATIENT)
Dept: FAMILY MEDICINE CLINIC | Age: 87
End: 2022-08-16
Payer: MEDICARE

## 2022-08-16 DIAGNOSIS — K21.9 GASTROESOPHAGEAL REFLUX DISEASE WITHOUT ESOPHAGITIS: ICD-10-CM

## 2022-08-16 DIAGNOSIS — I49.9 IRREGULAR HEART RATE: Primary | ICD-10-CM

## 2022-08-16 DIAGNOSIS — M19.90 OSTEOARTHRITIS, UNSPECIFIED OSTEOARTHRITIS TYPE, UNSPECIFIED SITE: ICD-10-CM

## 2022-08-16 DIAGNOSIS — G47.00 INSOMNIA, UNSPECIFIED TYPE: ICD-10-CM

## 2022-08-16 DIAGNOSIS — H35.30 MACULAR DEGENERATION, UNSPECIFIED LATERALITY, UNSPECIFIED TYPE: ICD-10-CM

## 2022-08-16 NOTE — PROGRESS NOTES
8/16/2022    Rosie Fonseca  1/23/1930    This resident is being seen today for a follow-up evaluation visit. She is a resident who has long-term medical conditions including dementia, macular degeneration, cellulitis, glaucoma, ataxia, arthritis, vitamin D deficiency, osteoarthritis, arthroplasty, insomnia, confusion, lumbar spinal problems with lumbosacral strain, with surgical history of right total hip. She is a 80 y.o. female resident who is being seen today for a follow-up evaluation visit with resident residing here in the [facility]. She is independently ambulatory at this time, but does have a walker available to her. She denies any current complaints with respect to pain, headaches or dizziness, sore throat, chest pain, coughing or shortness of breath, no nausea or vomiting, constipation or diarrhea, dysuria or frequency, fever or chills, falls or syncopal events. Medications:  Boost energy drink at lunchtime  Acetaminophen 650 mg twice daily  Memantine HCL 5 mg twice daily  Mirtazapine 15 mg at bedtime  Omeprazole 20 mg daily  PreserVision lutein softgel every 12 hours  Rivastigmine 6 mg twice a day  Timolol 0.5% eyedrops 1 drop each eye daily  Vitamin D2 50,000 units monthly  Acetaminophen 650 mg daily as needed Izzy-Lanta 30 cc every 2-4 hours as needed  Loperamide 2 mg every 4 hours as needed  Zofran 4 mg every 6 hours as needed        Objective     Vital Signs: /83 pulse 78 resp 16 temperature 98.2 Sao2 98% weight 113.5 pounds    Physical examination:Skin is essentially warm and dry. HEENT unremarkable. Neck is supple. Heart irregular. No rubs, gallops or murmurs noted. Lungs are clear to auscultation. No evidence of rhonchi, rales, or wheezing. Abdomen is soft, supple and non-tender. Bowel sounds are noted x4 quadrants. No rigidity, guarding or rebound tenderness. Negative Mo's, negative McBurney's, negative Abdulaziz's. Extremities; no true pitting edema. Pulses are adequate. No clubbing  or no cyanosis noted. Neurologically she  is alert and oriented x1-2. No evidence of paralysis or paresthesias noted. Diagnoses and all orders for this visit:    Irregular heart rate  Comments:  Obtain EKG    Osteoarthritis, unspecified osteoarthritis type, unspecified site  Comments:  Stable with vitamin supplementation    Gastroesophageal reflux disease without esophagitis  Comments:  Controlled with omeprazole    Insomnia, unspecified type  Comments:  Maintain low-dose mirtazapine    Macular degeneration, unspecified laterality, unspecified type  Comments:  Stable with observation       Plan: Plan of care was discussed with the healthcare team with meds and labs reviewed. BUNs/creatinine 13/1.01 with a GFR of 51 TSH of 2.300 H/H of 11.8/35.4 with a vitamin D of 48.9. I do have concern with what appears to be a change in her evaluation with an irregular heart rhythm. I am therefore can recommend an EKG be completed. She will furthermore continue with her current management as otherwise stated and I will see this resident routinely and as needed with further orders forthcoming. JOSE MARIA DEUTSCH, APRN - CNP      *Note was creating using voice recognition software.   The document was reviewed however grammatical errors may exist.

## 2022-08-31 RX ORDER — VIT C/E/CUPERIC/ZINC/LUTEIN 226-90-0.8
1 CAPSULE ORAL EVERY 12 HOURS
Qty: 62 CAPSULE | Refills: 5 | Status: SHIPPED | OUTPATIENT
Start: 2022-08-31

## 2022-12-14 RX ORDER — TIMOLOL MALEATE 5 MG/ML
SOLUTION/ DROPS OPHTHALMIC
Qty: 5 ML | Refills: 5 | Status: SHIPPED | OUTPATIENT
Start: 2022-12-14

## 2022-12-20 ENCOUNTER — OUTSIDE SERVICES (OUTPATIENT)
Dept: FAMILY MEDICINE CLINIC | Age: 87
End: 2022-12-20
Payer: MEDICARE

## 2022-12-20 DIAGNOSIS — Z78.9 DECREASED INDEPENDENCE WITH ACTIVITIES OF DAILY LIVING: Primary | ICD-10-CM

## 2022-12-20 DIAGNOSIS — E55.9 VITAMIN D DEFICIENCY: ICD-10-CM

## 2022-12-20 DIAGNOSIS — M19.90 OSTEOARTHRITIS, UNSPECIFIED OSTEOARTHRITIS TYPE, UNSPECIFIED SITE: ICD-10-CM

## 2022-12-20 DIAGNOSIS — H35.30 MACULAR DEGENERATION OF BOTH EYES, UNSPECIFIED TYPE: ICD-10-CM

## 2022-12-20 DIAGNOSIS — K21.9 GASTROESOPHAGEAL REFLUX DISEASE WITHOUT ESOPHAGITIS: ICD-10-CM

## 2022-12-20 NOTE — PROGRESS NOTES
12/20/2022    Cecelia Sexton  1/23/1930    This resident is being seen today for a follow-up evaluation visit. She is a resident who has long-term medical conditions including dementia, macular degeneration, cellulitis, glaucoma, ataxia, arthritis, vitamin D deficiency, osteoarthritis, arthroplasty, insomnia, confusion, lumbar spinal problems with lumbosacral strain, with surgical history of right total hip. She is a 80 y.o. female resident who is being seen today for a follow-up evaluation visit with this resident relatively pleasant throughout my examination. She did not have much of an appetite at lunch, but indicates that all they really do is eat, sleep or sit around and eat again. She does reside in the memory care unit on [facility] and Staff indicates that she really has had no changes in behavior in terms of aggressive, combative, or disruptive behaviors. She denies any complaints regarding pain, headaches or dizziness, sore throat, chest pain, coughing or shortness of breath, nausea or vomiting, constipation or diarrhea, dysuria or frequency, fever or chills, falls or syncopal events. Medications:  Boost energy drink at lunchtime  Acetaminophen 650 mg twice daily  Memantine HCL 5 mg twice daily  Mirtazapine 15 mg at bedtime  Omeprazole 20 mg daily  PreserVision lutein softgel every 12 hours  Rivastigmine 6 mg twice a day  Timolol 0.5% eyedrops 1 drop each eye daily  Vitamin D2 50,000 units monthly  Acetaminophen 650 mg daily as needed Izzy-Lanta 30 cc every 2-4 hours as needed  Loperamide 2 mg every 4 hours as needed  Zofran 4 mg every 6 hours as needed        Objective     Vital Signs: /70 pulse 71 resp 16 temperature 98.3 Sao2 98% weight 114 pounds        Physical examination:Skin is essentially warm and dry. HEENT unremarkable. Neck is supple. Heart irregular. No rubs, gallops or murmurs noted. Lungs are clear to auscultation. No evidence of rhonchi, rales, or wheezing.  Abdomen is Rx sent to [facility].  Please call them to cancel the previous Rx of focalin 5mg (non-XR) - thanks   soft, supple and non-tender. Bowel sounds are noted x4 quadrants. No rigidity, guarding or rebound tenderness. Negative Mo's, negative McBurney's, negative Abdulaziz's. Extremities; no true pitting edema. Pulses are adequate. No clubbing  or no cyanosis noted. Neurologically she  is alert and oriented x1-2. No evidence of paralysis or paresthesias noted. Diagnoses and all orders for this visit:    Decreased independence with activities of daily living  Comments:  Currently resides in [place] with assistance with activities of daily living provided    Vitamin D deficiency  Comments:  Controlled with vitamin D supplementation    Macular degeneration of both eyes, unspecified type  Comments:  Stable with eyedrops as indicated    Osteoarthritis, unspecified osteoarthritis type, unspecified site  Comments:  Stable with vitamin supplementation with Tylenol for pain management    Gastroesophageal reflux disease without esophagitis  Comments:  Controlled with omeprazole         Plan: Plan of care was discussed with the healthcare team with meds and labs reviewed. Labs from July were furthermore noted and reviewed. Labs are currently set up to be drawn every 6 months. She does appear to be tolerating her current medication in a satisfactory manner. She will therefore continue with her plan of care as stated and I will continue to see this resident routinely and as needed with further orders forthcoming. JOSE MARIA DEUTSCH, APRN - CNP      *Note was creating using voice recognition software.   The document was reviewed however grammatical errors may exist.

## 2022-12-27 RX ORDER — SENNOSIDES 8.6 MG
650 CAPSULE ORAL 2 TIMES DAILY
Qty: 62 TABLET | Refills: 5 | Status: SHIPPED | OUTPATIENT
Start: 2022-12-27 | End: 2023-01-27

## 2022-12-27 RX ORDER — OMEPRAZOLE 20 MG/1
20 CAPSULE, DELAYED RELEASE ORAL DAILY
Qty: 31 CAPSULE | Refills: 5 | Status: SHIPPED | OUTPATIENT
Start: 2022-12-27 | End: 2023-07-01

## 2023-01-11 ENCOUNTER — OUTSIDE SERVICES (OUTPATIENT)
Dept: FAMILY MEDICINE CLINIC | Age: 88
End: 2023-01-11

## 2023-01-11 DIAGNOSIS — R26.2 AMBULATORY DYSFUNCTION: ICD-10-CM
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
## 2023-01-11 DIAGNOSIS — M19.90 OSTEOARTHRITIS, UNSPECIFIED OSTEOARTHRITIS TYPE, UNSPECIFIED SITE: ICD-10-CM

## 2023-01-11 DIAGNOSIS — G47.00 INSOMNIA, UNSPECIFIED TYPE: ICD-10-CM

## 2023-01-11 DIAGNOSIS — R13.10 DYSPHAGIA, UNSPECIFIED TYPE: Primary | ICD-10-CM

## 2023-01-11 NOTE — PROGRESS NOTES
1/11/2023    Hi Wolf  1/23/1930    This resident is being seen today for an acute evaluation visit. She is a resident who has long-term medical conditions including dementia, macular degeneration, cellulitis, glaucoma, ataxia, arthritis, vitamin D deficiency, osteoarthritis, arthroplasty, insomnia, confusion, lumbar spinal problems with lumbosacral strain, with surgical history of right total hip. She is a 80 y.o. female resident who is being seen today for an acute evaluation visit per request of staff secondary to the fact that they state that she has been having trouble swallowing her food/pills. I did speak with the nurse today and she states that she has not had any issues with her when she has been taking her pills, although she does not give them on a daily basis. The [facility] in [facility] did state that they have noticed her coughing with her meals, so there is concern that she is definitely having some degree of difficulty. The resident does not recall any difficulties with chewing or swallowing. She furthermore denies any complaints with respect to any headaches or dizziness, sore throat, coughing or shortness of breath, nausea or vomiting, constipation or diarrhea, dysuria or frequency, fever or chills, or syncopal events. It is of note to mention that she did have a witnessed fall on 1/5/2023 without apparent injury.           Medications:  Boost energy drink at lunchtime  Acetaminophen 650 mg twice daily  Memantine HCL 5 mg twice daily  Mirtazapine 15 mg at bedtime  Omeprazole 20 mg daily  PreserVision lutein softgel every 12 hours  Rivastigmine 6 mg twice a day  Timolol 0.5% eyedrops 1 drop each eye daily  Vitamin D2 50,000 units monthly  Acetaminophen 650 mg daily as needed Izzy-Lanta 30 cc every 2-4 hours as needed  Loperamide 2 mg every 4 hours as needed  Zofran 4 mg every 6 hours as needed        Objective     Vital Signs: /72 pulse 71 resp 16 temperature 98.4 Sao2 98% weight 114 pounds        Physical examination:Skin is essentially warm and dry. HEENT unremarkable. Neck is supple. Heart irregular. No rubs, gallops or murmurs noted. Lungs are clear to auscultation. No evidence of rhonchi, rales, or wheezing. Abdomen is soft, supple and non-tender. Bowel sounds are noted x4 quadrants. No rigidity, guarding or rebound tenderness. Negative Mo's, negative McBurney's, negative Abdulaziz's. Extremities; no true pitting edema. Pulses are adequate. No clubbing  or no cyanosis noted. Neurologically she  is alert and oriented x1-2. No evidence of paralysis or paresthesias noted. Diagnoses and all orders for this visit:    Dysphagia, unspecified type  Comments:  Obtain modified barium swallow    Fall, sequela  Comments:  Occurred 1/5/2023 without apparent injury    Insomnia, unspecified type  Comments:  Currently relatively controlled with the benefits of mirtazapine at bedtime    Ambulatory dysfunction  Comments:  Essentially self propels with respect to a wheelchair    Osteoarthritis, unspecified osteoarthritis type, unspecified site  Comments:  Stable with vitamin supplementation with Tylenol for pain management           Plan: Plan of care was discussed with the healthcare team with meds and labs reviewed. There is some question as to whether this resident is having any true difficulty regarding her meal intake and swallowing of her pills. I am therefore going to recommend at this time that she have the benefits of a modified barium swallow due to what appears to be some questionable underlying dysphagia. She will furthermore continue with her current management with close observation and I will see her routinely and as needed with further orders forthcoming. JOSE MARIA DEUTSCH, JOJO - CNP      *Note was creating using voice recognition software.   The document was reviewed however grammatical errors may exist.

## 2023-01-18 LAB
ABSOLUTE BASO #: 0 10*3/UL (ref 0–0.1)
ABSOLUTE EOS #: 0.3 10*3/UL (ref 0–0.4)
ABSOLUTE NEUT #: 2.8 10*3/UL (ref 2.3–7.9)
ALBUMIN: 3.5 GM/DL (ref 3.4–5)
ALP BLD-CCNC: 87 U/L (ref 46–116)
ALT SERPL-CCNC: <7 U/L (ref 10–49)
AST SERPL-CCNC: 16 IU/L (ref 0–34)
BASOPHILS %: 0.7 % (ref 0–1)
BILIRUB SERPL-MCNC: 0.4 MG/DL (ref 0.3–1.2)
BUN BLDV-MCNC: 12 MG/DL (ref 9–23)
CALCIUM SERPL-MCNC: 9.4 MD/DL (ref 8.7–10.4)
CHLORIDE BLD-SCNC: 107 MMOL/L (ref 98–107)
CHOLESTEROL: 99 MG/DL
CO2: 24 MMOL/L (ref 20–31)
CREAT SERPL-MCNC: 0.89 MG/DL (ref 0.55–1.02)
EOSINOPHILS %: 4.8 % (ref 1–4)
GFR AFRICAN AMERICAN: > 60 ML/MIN
GFR SERPL CREATININE-BSD FRML MDRD: 59 ML/MIN/
GLUCOSE: 83 MG/DL (ref 65–99)
HCT VFR BLD CALC: 37.9 % (ref 37–47)
HDLC SERPL-MCNC: 32 MG/DL (ref 40–60)
HEMOGLOBIN: 12.3 G/DL (ref 12–16)
IMMATURE GRANULOCYTES #: 0 10*3/UL (ref 0–0.1)
IMMATURE GRANULOCYTES: 0.2 % (ref 0–1)
LDL CHOLESTEROL: 40 MG/DL (ref 9–159)
LYMPHOCYTE %: 36 % (ref 27–41)
LYMPHOCYTES # BLD: 2 10*3/UL (ref 1.3–4.4)
MCH RBC QN AUTO: 30.6 PG (ref 27–31)
MCHC RBC AUTO-ENTMCNC: 32.5 G/DL (ref 33–37)
MCV RBC AUTO: 94.3 FL (ref 81–99)
MONOCYTES # BLD: 0.5 10*3/UL (ref 0.1–1)
MONOCYTES %: 8.6 % (ref 3–9)
NEUTROPHILS %: 49.7 % (ref 47–73)
NUCLEATED RED BLOOD CELLS: 0 % (ref 0–0)
PDW BLD-RTO: 13.9 % (ref 0–14.5)
PLATELET # BLD: 240 10*3/UL (ref 130–400)
PMV BLD AUTO: 9.1 FL (ref 9.6–12.3)
POTASSIUM SERPL-SCNC: 4.8 MMOL/L (ref 3.4–5.1)
RBC # BLD: 4.02 10*6/UL (ref 4.1–5.1)
SODIUM BLD-SCNC: 139 MMOL/L (ref 136–145)
T4 FREE: 1.02 NG/DL (ref 0.89–1.76)
TOTAL PROTEIN: 7.3 GM/DL (ref 6–8)
TRIGL SERPL-MCNC: 134 MG/DL
TSH SERPL DL<=0.05 MIU/L-ACNC: 2.9 UIU/ML (ref 0.55–4.78)
VITAMIN D 25-HYDROXY: 49.1 NG/ML (ref 30–100)
VLDLC SERPL CALC-MCNC: 27 MG/DL (ref 6–40)
WBC # BLD: 5.6 10*3/UL (ref 4.8–10.8)

## 2023-02-21 ENCOUNTER — OUTSIDE SERVICES (OUTPATIENT)
Dept: FAMILY MEDICINE CLINIC | Age: 88
End: 2023-02-21

## 2023-02-21 DIAGNOSIS — R06.2 WHEEZE: Primary | ICD-10-CM

## 2023-02-21 DIAGNOSIS — F03.90 DEMENTIA WITHOUT BEHAVIORAL DISTURBANCE (HCC): ICD-10-CM

## 2023-02-21 DIAGNOSIS — E55.9 VITAMIN D DEFICIENCY: ICD-10-CM

## 2023-02-21 DIAGNOSIS — M19.90 OSTEOARTHRITIS, UNSPECIFIED OSTEOARTHRITIS TYPE, UNSPECIFIED SITE: ICD-10-CM

## 2023-02-21 DIAGNOSIS — K21.9 GASTROESOPHAGEAL REFLUX DISEASE WITHOUT ESOPHAGITIS: ICD-10-CM

## 2023-02-24 RX ORDER — VIT C/E/CUPERIC/ZINC/LUTEIN 226-90-0.8
1 CAPSULE ORAL EVERY 12 HOURS
Qty: 62 CAPSULE | Refills: 5 | Status: SHIPPED | OUTPATIENT
Start: 2023-02-24

## 2023-07-12 LAB
ABSOLUTE BASO #: 0 10*3/UL (ref 0–0.1)
ABSOLUTE EOS #: 0.2 10*3/UL (ref 0–0.4)
ABSOLUTE NEUT #: 1.5 10*3/UL (ref 2.3–7.9)
ALBUMIN: 3.5 GM/DL (ref 3.4–5)
ALP BLD-CCNC: 77 U/L (ref 46–116)
ALT SERPL-CCNC: <7 U/L (ref 10–49)
AST SERPL-CCNC: 14 IU/L (ref 0–34)
BASOPHILS %: 0.5 % (ref 0–1)
BILIRUB SERPL-MCNC: 0.5 MG/DL (ref 0.3–1.2)
BUN BLDV-MCNC: 11 MG/DL (ref 9–23)
CALCIUM SERPL-MCNC: 9.4 MD/DL (ref 8.7–10.4)
CHLORIDE BLD-SCNC: 110 MMOL/L (ref 98–107)
CHOLESTEROL: 97 MG/DL
CO2: 22 MMOL/L (ref 20–31)
CREAT SERPL-MCNC: 1 MG/DL (ref 0.55–1.02)
EOSINOPHILS %: 5.1 % (ref 1–4)
GFR AFRICAN AMERICAN: > 60 ML/MIN
GFR SERPL CREATININE-BSD FRML MDRD: 52 ML/MIN/
GLUCOSE: 91 MG/DL (ref 65–99)
HCT VFR BLD CALC: 33.2 % (ref 37–47)
HDLC SERPL-MCNC: 30 MG/DL (ref 40–60)
HEMOGLOBIN: 11 G/DL (ref 12–16)
IMMATURE GRANULOCYTES #: 0 10*3/UL (ref 0–0.1)
IMMATURE GRANULOCYTES: 0.3 % (ref 0–1)
LDL CHOLESTEROL: 39 MG/DL (ref 9–159)
LYMPHOCYTE %: 45.9 % (ref 27–41)
LYMPHOCYTES # BLD: 1.8 10*3/UL (ref 1.3–4.4)
MCH RBC QN AUTO: 30.9 PG (ref 27–31)
MCHC RBC AUTO-ENTMCNC: 33.1 G/DL (ref 33–37)
MCV RBC AUTO: 93.3 FL (ref 81–99)
MONOCYTES # BLD: 0.4 10*3/UL (ref 0.1–1)
MONOCYTES %: 9.6 % (ref 3–9)
NEUTROPHILS %: 38.6 % (ref 47–73)
NUCLEATED RED BLOOD CELLS: 0 % (ref 0–0)
PDW BLD-RTO: 13.2 % (ref 0–14.5)
PLATELET # BLD: 219 10*3/UL (ref 130–400)
PMV BLD AUTO: 9.1 FL (ref 9.6–12.3)
POTASSIUM SERPL-SCNC: 3.9 MMOL/L (ref 3.4–5.1)
RBC # BLD: 3.56 10*6/UL (ref 4.1–5.1)
SODIUM BLD-SCNC: 139 MMOL/L (ref 136–145)
T4 FREE: 0.99 NG/DL (ref 0.89–1.76)
TOTAL PROTEIN: 7 GM/DL (ref 6–8)
TRIGL SERPL-MCNC: 138 MG/DL
TSH SERPL DL<=0.05 MIU/L-ACNC: 3.45 UIU/ML (ref 0.55–4.78)
VITAMIN B-12: 245 PG/ML (ref 211–911)
VITAMIN D 25-HYDROXY: 52.7 NG/ML (ref 30–100)
VLDLC SERPL CALC-MCNC: 28 MG/DL (ref 6–40)
WBC # BLD: 3.9 10*3/UL (ref 4.8–10.8)

## 2025-03-09 LAB
BACTERIA, URINE: ABNORMAL
BILIRUB SERPL-MCNC: NEGATIVE MG/DL
BLOOD: ABNORMAL
CLARITY, UA: ABNORMAL
COLOR, UA: YELLOW
COMMENT: YES
EPITHELIAL CELLS, UA: ABNORMAL
GLUCOSE: NEGATIVE
KETONES: NEGATIVE
LEUKOCYTE ESTERASE, URINE: ABNORMAL
NITRITE, URINE: POSITIVE
PH, URINE: 5.5 (ref 4.5–8)
PROTEIN UA: NEGATIVE
RBC UA: ABNORMAL RBC/HPF (ref 0–2)
SPECIFIC GRAVITY UA: 1.01 (ref 1–1.03)
UROBILINOGEN, URINE: 0.2 E.U./DL (ref 0–1)
WBC URINE: ABNORMAL WBC/HPF (ref 0–5)

## 2025-03-11 LAB — URINE CULTURE, ROUTINE: ABNORMAL

## 2025-03-26 ENCOUNTER — RESULTS FOLLOW-UP (OUTPATIENT)
Dept: FAMILY MEDICINE CLINIC | Age: 89
End: 2025-03-26